# Patient Record
(demographics unavailable — no encounter records)

---

## 2024-11-05 NOTE — PHYSICAL EXAM
[General Appearance - Alert] : alert [General Appearance - In No Acute Distress] : in no acute distress [General Appearance - Well Nourished] : well nourished [General Appearance - Well Developed] : well developed [General Appearance - Well-Appearing] : well appearing [Appearance Of Head] : the head was normocephalic [Facies] : there were no dysmorphic facial features [Sclera] : the conjunctiva were normal [Outer Ear] : the ears and nose were normal in appearance [Examination Of The Oral Cavity] : mucous membranes were moist and pink [Auscultation Breath Sounds / Voice Sounds] : breath sounds clear to auscultation bilaterally [Normal Chest Appearance] : the chest was normal in appearance [Apical Impulse] : quiet precordium with normal apical impulse [Heart Rate And Rhythm] : normal heart rate and rhythm [Heart Sounds] : normal S1 and S2 [No Murmur] : no murmurs  [Heart Sounds Gallop] : no gallops [Heart Sounds Pericardial Friction Rub] : no pericardial rub [Arterial Pulses] : normal upper and lower extremity pulses with no pulse delay [Edema] : no edema [Heart Sounds Click] : no clicks [Capillary Refill Test] : normal capillary refill [Systolic] : systolic [II] : a grade 2/6 [Bowel Sounds] : normal bowel sounds [Abdomen Soft] : soft [Nondistended] : nondistended [Abdomen Tenderness] : non-tender [Nail Clubbing] : no clubbing  or cyanosis of the fingers [Motor Tone] : normal muscle strength and tone [Cervical Lymph Nodes Enlarged Anterior] : The anterior cervical nodes were normal [Cervical Lymph Nodes Enlarged Posterior] : The posterior cervical nodes were normal [] : no rash [Skin Lesions] : no lesions [Skin Turgor] : normal turgor [Demonstrated Behavior - Infant Nonreactive To Parents] : interactive [Mood] : mood and affect were appropriate for age [Demonstrated Behavior] : normal behavior [LUSB] : LUSB [Ejection] : ejection [Med] : medium pitched

## 2024-11-05 NOTE — PHYSICAL EXAM
[General Appearance - Alert] : alert [General Appearance - In No Acute Distress] : in no acute distress [General Appearance - Well Nourished] : well nourished [General Appearance - Well Developed] : well developed [General Appearance - Well-Appearing] : well appearing [Appearance Of Head] : the head was normocephalic [Facies] : there were no dysmorphic facial features [Sclera] : the conjunctiva were normal [Outer Ear] : the ears and nose were normal in appearance [Examination Of The Oral Cavity] : mucous membranes were moist and pink [Auscultation Breath Sounds / Voice Sounds] : breath sounds clear to auscultation bilaterally [Normal Chest Appearance] : the chest was normal in appearance [Apical Impulse] : quiet precordium with normal apical impulse [Heart Rate And Rhythm] : normal heart rate and rhythm [Heart Sounds] : normal S1 and S2 [No Murmur] : no murmurs  [Heart Sounds Gallop] : no gallops [Heart Sounds Pericardial Friction Rub] : no pericardial rub [Edema] : no edema [Arterial Pulses] : normal upper and lower extremity pulses with no pulse delay [Heart Sounds Click] : no clicks [Capillary Refill Test] : normal capillary refill [Systolic] : systolic [II] : a grade 2/6 [Bowel Sounds] : normal bowel sounds [Abdomen Soft] : soft [Nondistended] : nondistended [Abdomen Tenderness] : non-tender [Nail Clubbing] : no clubbing  or cyanosis of the fingers [Motor Tone] : normal muscle strength and tone [Cervical Lymph Nodes Enlarged Anterior] : The anterior cervical nodes were normal [Cervical Lymph Nodes Enlarged Posterior] : The posterior cervical nodes were normal [] : no rash [Skin Lesions] : no lesions [Skin Turgor] : normal turgor [Demonstrated Behavior - Infant Nonreactive To Parents] : interactive [Mood] : mood and affect were appropriate for age [Demonstrated Behavior] : normal behavior [LUSB] : LUSB [Ejection] : ejection [Med] : medium pitched

## 2024-11-06 NOTE — CARDIOLOGY SUMMARY
[Today's Date] : [unfilled] [de-identified] : 11/5/24 [FreeTextEntry1] : sinus rhythm, rate 53 bpm, axis +93, UT 0.14,0.14, QTC 0.40 right bundle branch block.   [de-identified] : 11/5/24 [FreeTextEntry2] : Summary:  1. Status post primary closure of interatrial septal defect. 2. No residual interatrial shunt identified. 3. S/p RV patch augmentation and muscle bundle resection. 4. Status post takedown and repair of tricuspid valve as part of VSD closure. 5. No residual right ventricular outflow tract obstruction. 6. Status post patch closure of perimembranous ventricular septal defect. 7. No residual ventricular septal defect. 8. Trivial stenosis across the pulmonary valve, peak gradient of ~9 mm Hg. 9. Qualitatively normal right ventricular systolic function. 10. Normal left ventricular systolic function with normal chamber dimensions. 11. Status post surgical ligation of the ductus arteriosus. 12. 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 Size discrepancy between branch pulmonary arteries, left is smaller than the right. 13. No residual shunt identified across the ductus arteriosus. 14. Mildly dilated aortic root

## 2024-11-06 NOTE — CONSULT LETTER
[Today's Date] : [unfilled] [Name] : Name: [unfilled] [] : : ~~ [Today's Date:] : [unfilled] [Dear  ___:] : Dear Dr. [unfilled]: [Consult - Single Provider] : Thank you very much for allowing me to participate in the care of this patient. If you have any questions, please do not hesitate to contact me. [Sincerely,] : Sincerely, [FreeTextEntry9] : 11/5/24 [FreeTextEntry4] : Dr. Huy Reveles [FreeTextEntry5] : [address] [FreeTextEntry6] : [place], [address] [FreeTextEntry1] : 11/5/24 [de-identified] : Nikolai Espinosa MD, FAAP, FACC, FAHA Chief Emeritus, Division of Pediatric Cardiology [facility] Professor, Department of Pediatrics, [facility]

## 2024-11-06 NOTE — CONSULT LETTER
[Today's Date] : [unfilled] [Name] : Name: [unfilled] [] : : ~~ [Today's Date:] : [unfilled] [Dear  ___:] : Dear Dr. [unfilled]: [Consult - Single Provider] : Thank you very much for allowing me to participate in the care of this patient. If you have any questions, please do not hesitate to contact me. [Sincerely,] : Sincerely, [FreeTextEntry9] : 11/5/24 [FreeTextEntry4] : Dr. Huy Reveles [FreeTextEntry5] : [address] [FreeTextEntry6] : [place], [address] [FreeTextEntry1] : 11/5/24 [de-identified] : Nikolai Espinosa MD, FAAP, FACC, FAHA Chief Emeritus, Division of Pediatric Cardiology [facility] Professor, Department of Pediatrics, [facility]

## 2024-11-06 NOTE — DISCUSSION/SUMMARY
[May participate in all age-appropriate activities] : [unfilled] May participate in all age-appropriate activities. [Needs SBE Prophylaxis] : [unfilled] does not need bacterial endocarditis prophylaxis [FreeTextEntry1] : in summary, Jessica is an 11-year-old boy status post-surgical repair of a large VSD with RV muscle bundle resection, PA and PFO closure doing well since his surgery. 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 He will need follow-up throughout his childhood for follow-up of the VSD closure. I would like to see him in 1 year to reassess his aortic root, LPA, and f/u his VSD repair. In the meantime, He does not require antibiotic prophylaxis prior to procedures of risk and may continue to determine his own level of activity.

## 2024-11-06 NOTE — REVIEW OF SYSTEMS
[Shortness Of Breath] : expressed as feeling short of breath [Feeling Poorly] : not feeling poorly (malaise) [Fever] : no fever [Wgt Loss (___ Lbs)] : no recent weight loss [Pallor] : not pale [Eye Discharge] : no eye discharge [Redness] : no redness [Change in Vision] : no change in vision [Nasal Stuffiness] : no nasal congestion [Sore Throat] : no sore throat [Earache] : no earache [Loss Of Hearing] : no hearing loss [Cyanosis] : no cyanosis [Edema] : no edema [Diaphoresis] : not diaphoretic [Chest Pain] : no chest pain or discomfort [Exercise Intolerance] : no persistence of exercise intolerance [Palpitations] : no palpitations [Orthopnea] : no orthopnea [Fast HR] : no tachycardia [Tachypnea] : not tachypneic [Wheezing] : no wheezing [Cough] : no cough [Vomiting] : no vomiting [Diarrhea] : no diarrhea [Abdominal Pain] : no abdominal pain [Decrease In Appetite] : appetite not decreased [Fainting (Syncope)] : no fainting [Seizure] : no seizures [Headache] : no headache [Dizziness] : no dizziness [Limping] : no limping [Joint Pains] : no arthralgias [Joint Swelling] : no joint swelling [Rash] : no rash [Wound problems] : no wound problems [Easy Bruising] : no tendency for easy bruising [Swollen Glands] : no lymphadenopathy [Easy Bleeding] : no ~M tendency for easy bleeding [Nosebleeds] : no epistaxis [Sleep Disturbances] : ~T no sleep disturbances [Hyperactive] : no hyperactive behavior [Depression] : no depression [Anxiety] : no anxiety [Failure To Thrive] : no failure to thrive [Short Stature] : short stature was not noted [Jitteriness] : no jitteriness [Heat/Cold Intolerance] : no temperature intolerance [Dec Urine Output] : no oliguria [FreeTextEntry1] : SOB with activity

## 2024-11-06 NOTE — REASON FOR VISIT
[Follow-Up] : a follow-up visit for [Father] : father [FreeTextEntry3] : VSD/PFO closure and PDA ligation

## 2024-11-06 NOTE — CARDIOLOGY SUMMARY
[Today's Date] : [unfilled] [de-identified] : 11/5/24 [FreeTextEntry1] : sinus rhythm, rate 53 bpm, axis +93, [place] 0.14,0.14, QTC 0.40 right bundle branch block.   [de-identified] : 11/5/24 [FreeTextEntry2] : Summary:  1. Status post primary closure of interatrial septal defect. 2. No residual interatrial shunt identified. 3. S/p RV patch augmentation and muscle bundle resection. 4. Status post takedown and repair of tricuspid valve as part of VSD closure. 5. No residual right ventricular outflow tract obstruction. 6. Status post patch closure of perimembranous ventricular septal defect. 7. No residual ventricular septal defect. 8. Trivial stenosis across the pulmonary valve, peak gradient of ~9 mm Hg. 9. Qualitatively normal right ventricular systolic function. 10. Normal left ventricular systolic function with normal chamber dimensions. 11. Status post surgical ligation of the ductus arteriosus. 12. 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 Size discrepancy between branch pulmonary arteries, left is smaller than the right. 13. No residual shunt identified across the ductus arteriosus. 14. Mildly dilated aortic root

## 2025-01-28 NOTE — REASON FOR VISIT
Anesthesia Post-op Note    Patient: Gail Cortez  Procedure(s) Performed: LEFT VITRECTOMY, USING 25-GAUGE INSTRUMENTS, RETINAL DETACHMENT REPAIR, ENDODIATHERMY, ENDOLASER, GAS C3F8 15% (Left: Eye)  Anesthesia type: MAC    Vitals Value Taken Time   Temp 36.2 01/28/25 1102   Pulse 75 01/28/25 1102   Resp 16 01/28/25 1102   SpO2 100% 01/28/25 1102   /69 01/28/25 1102         Patient Location: bedside  Post-op Vital Signs:stable  Level of Consciousness: participates in exam and awake  Respiratory Status: spontaneous ventilation  Cardiovascular blood pressure returned to baseline  Hydration: euvolemic  Pain Management: adequately controlled  Handoff: Handoff to receiving clinician was performed and questions were answered  Vomiting: none  Nausea: None  Airway Patency:patent  Post-op Assessment: awake, alert, appropriately conversant, or baseline, no complications, patient tolerated procedure well, regional anesthetic in place - able to participate, dentition, mouth, tongue, and oropharynx unchanged , moving all extremities and No Corneal Abrasion      No notable events documented.                       [Follow-Up] : a follow-up visit for [Father] : father [FreeTextEntry3] : VSD/PFO closure and PDA ligation

## 2025-02-26 NOTE — PHYSICAL EXAM
[General Appearance - Alert] : alert [General Appearance - In No Acute Distress] : in no acute distress [General Appearance - Well Nourished] : well nourished [General Appearance - Well Developed] : well developed [General Appearance - Well-Appearing] : well appearing [Appearance Of Head] : the head was normocephalic [Facies] : there were no dysmorphic facial features [Sclera] : the conjunctiva were normal [PERRL With Normal Accommodation] : the pupils were equal in size, round, and reactive to light [Outer Ear] : the ears and nose were normal in appearance [Examination Of The Oral Cavity] : mucous membranes were moist and pink [Auscultation Breath Sounds / Voice Sounds] : breath sounds clear to auscultation bilaterally [Normal Chest Appearance] : the chest was normal in appearance [Well-Healed] : well-healed [Apical Impulse] : quiet precordium with normal apical impulse [Heart Rate And Rhythm] : normal heart rate and rhythm [Heart Sounds] : normal S1 and S2 [Heart Sounds Gallop] : no gallops [Heart Sounds Pericardial Friction Rub] : no pericardial rub [Edema] : no edema [Arterial Pulses] : normal upper and lower extremity pulses with no pulse delay [Heart Sounds Click] : no clicks [Capillary Refill Test] : normal capillary refill [Systolic] : systolic [I] : a grade 1/6  [LUSB] : LUSB [Short] : short [Early] : early [Base] : the murmur was transmitted to the base [Bowel Sounds] : normal bowel sounds [Abdomen Soft] : soft [Nondistended] : nondistended [Abdomen Tenderness] : non-tender [Nail Clubbing] : no clubbing  or cyanosis of the fingers [Cervical Lymph Nodes Enlarged Anterior] : The anterior cervical nodes were normal [Cervical Lymph Nodes Enlarged Posterior] : The posterior cervical nodes were normal [] : no rash [Skin Lesions] : no lesions [Skin Turgor] : normal turgor [Sternotomy] : sternotomy [Hypertrophic] : hypertrophic [Keloid] : notable for keloids [Thick/Raised] : thick and raised [Demonstrated Behavior - Infant Nonreactive To Parents] : interactive [Mood] : mood and affect were appropriate for age [Demonstrated Behavior] : normal behavior [FreeTextEntry3] : Wears glasses [FreeTextEntry7] : see picture

## 2025-02-26 NOTE — CARDIOLOGY SUMMARY
[Today's Date] : [unfilled] [de-identified] : 2/25/25 [FreeTextEntry1] : Sinus bradycardia, rate 56 bpm, QRS axis +108, [place] 0.14, QRS 0.136, QTc 0.39 seconds; incomplete right bundle branch block. [de-identified] : 2/25/25 [FreeTextEntry2] : Summary:  1. Technically difficult examination due to suboptimal echocardiographic windows. 2. No residual interatrial shunt identified. 3. No residual right ventricular outflow tract obstruction. 4. Normal tricuspid and mitral valve function. 5. Trivial pulmonary stenosis. Maximum instantaneous gradient ~9 mm Hg. 6. Left pulmonary artery was not well seen. Probably mild left pulmonary artery stenosis. Maximum  instantaneous gradient ~17 mm Hg. 7. Normal right branch pulmonary artery. 8. Aortic valve morphology was not well visualized. Possible partial fusion of right-non commissure. 9. Mildly dilated aortic root. 10. Trivial aortic valve regurgitation. 11. Normal left ventricular size, morphology and systolic function. 12. Qualitatively normal right ventricular systolic function. 13. No pericardial effusion. 14. There has been no significant interval change. [de-identified] : Ordered with MVO2

## 2025-02-26 NOTE — CONSULT LETTER
[Today's Date] : [unfilled] [Name] : Name: [unfilled] [] : : ~~ [Today's Date:] : [unfilled] [Dear  ___:] : Dear Dr. [unfilled]: [Consult] : I had the pleasure of evaluating your patient, [unfilled]. My full evaluation follows. [Consult - Single Provider] : Thank you very much for allowing me to participate in the care of this patient. If you have any questions, please do not hesitate to contact me. [Sincerely,] : Sincerely, [DrDominique  ___] : Dr. MEDEROS [FreeTextEntry9] : 2/25/25 [FreeTextEntry4] : Huy Reveles MD [FreeTextEntry5] : [address], [place] [address] [FreeTextEntry1] : 2/25/25 [de-identified] : Nikolai Espinosa MD, FAAP, FACC, FAHA Chief Emeritus, Division of Pediatric Cardiology [facility] Professor, Department of Pediatrics, [facility]

## 2025-02-26 NOTE — DISCUSSION/SUMMARY
[May participate in all age-appropriate activities] : [unfilled] May participate in all age-appropriate activities. [Needs SBE Prophylaxis] : [unfilled] does not need bacterial endocarditis prophylaxis [FreeTextEntry1] : in summary, Jessica is an 11-year-old boy status post-surgical repair of a large VSD with RV muscle bundle resection, PA and PFO closure doing well since his surgery. 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  I have arranged for an exercise stress test.  In addition, he has a thick keloid sternotomy scar which I will have Dr. Henry and if necessary,dermatology reviewed to see if anything can be done for it cosmetically.   I ordered an exercise stress test with MVO2to assess his aerobic capacity. He does not require antibiotic prophylaxis prior to procedures of risk and may continue to determine his own level of activity.

## 2025-02-26 NOTE — CONSULT LETTER
[Today's Date] : [unfilled] [Name] : Name: [unfilled] [] : : ~~ [Today's Date:] : [unfilled] [Dear  ___:] : Dear Dr. [unfilled]: [Consult] : I had the pleasure of evaluating your patient, [unfilled]. My full evaluation follows. [Consult - Single Provider] : Thank you very much for allowing me to participate in the care of this patient. If you have any questions, please do not hesitate to contact me. [Sincerely,] : Sincerely, [DrDominique  ___] : Dr. MEDEROS [FreeTextEntry9] : 2/25/25 [FreeTextEntry4] : Huy Reveles MD [FreeTextEntry5] : [address], [place] [address] [FreeTextEntry1] : 2/25/25 [de-identified] : Nikolai Espinosa MD, FAAP, FACC, FAHA Chief Emeritus, Division of Pediatric Cardiology [facility] Professor, Department of Pediatrics, [facility]

## 2025-02-26 NOTE — CARDIOLOGY SUMMARY
[Today's Date] : [unfilled] [de-identified] : 2/25/25 [FreeTextEntry1] : Sinus bradycardia, rate 56 bpm, QRS axis +108, [place] 0.14, QRS 0.136, QTc 0.39 seconds; incomplete right bundle branch block. [de-identified] : 2/25/25 [FreeTextEntry2] : Summary:  1. Technically difficult examination due to suboptimal echocardiographic windows. 2. No residual interatrial shunt identified. 3. No residual right ventricular outflow tract obstruction. 4. Normal tricuspid and mitral valve function. 5. Trivial pulmonary stenosis. Maximum instantaneous gradient ~9 mm Hg. 6. Left pulmonary artery was not well seen. Probably mild left pulmonary artery stenosis. Maximum  instantaneous gradient ~17 mm Hg. 7. Normal right branch pulmonary artery. 8. Aortic valve morphology was not well visualized. Possible partial fusion of right-non commissure. 9. Mildly dilated aortic root. 10. Trivial aortic valve regurgitation. 11. Normal left ventricular size, morphology and systolic function. 12. Qualitatively normal right ventricular systolic function. 13. No pericardial effusion. 14. There has been no significant interval change. [de-identified] : Ordered with MVO2